# Patient Record
Sex: MALE | Race: BLACK OR AFRICAN AMERICAN | NOT HISPANIC OR LATINO | Employment: FULL TIME | URBAN - METROPOLITAN AREA
[De-identification: names, ages, dates, MRNs, and addresses within clinical notes are randomized per-mention and may not be internally consistent; named-entity substitution may affect disease eponyms.]

---

## 2023-04-10 ENCOUNTER — TELEPHONE (OUTPATIENT)
Dept: LAB | Facility: CLINIC | Age: 23
End: 2023-04-10
Payer: COMMERCIAL

## 2023-08-21 NOTE — TELEPHONE ENCOUNTER
----- Message from Aviva Flores sent at 4/10/2023  1:04 PM CDT -----  Contact: pt  Type:  Sooner Appointment Request    Caller is requesting a sooner appointment.  Caller declined first available appointment listed below.  Caller will not accept being placed on the waitlist and is requesting a message be sent to doctor.    Name of Caller:  pt   When is the first available appointment?  4/20   Symptoms:  wants to est. Care   Best Call Back Number:  [phone]    Additional Information:  pt is wanting to est care. But has a preferred date and time because of work and transportation . Preff: 4/20 between the hours of 9:45-11 a.m  or on thursdays with same time frame . Please call back and advise .        VISIT SUMMARY:     Testing needed: NONE    Labs needed: NONE    Consults: NONE     Follow up: Follow up in 1 year.     If you have any questions or concerns, please call our office- [phone].